# Patient Record
Sex: FEMALE | URBAN - METROPOLITAN AREA
[De-identification: names, ages, dates, MRNs, and addresses within clinical notes are randomized per-mention and may not be internally consistent; named-entity substitution may affect disease eponyms.]

---

## 2022-11-23 ENCOUNTER — HOME HEALTH ADMISSION (OUTPATIENT)
Dept: HOME HEALTH SERVICES | Facility: HOME HEALTH | Age: 87
End: 2022-11-23
Payer: MEDICARE

## 2022-11-25 ENCOUNTER — HOME CARE VISIT (OUTPATIENT)
Dept: SCHEDULING | Facility: HOME HEALTH | Age: 87
End: 2022-11-25

## 2022-11-25 VITALS
HEART RATE: 64 BPM | RESPIRATION RATE: 18 BRPM | OXYGEN SATURATION: 98 % | SYSTOLIC BLOOD PRESSURE: 122 MMHG | DIASTOLIC BLOOD PRESSURE: 74 MMHG | TEMPERATURE: 97.4 F

## 2022-11-25 PROCEDURE — 0221000100 HH NO PAY CLAIM PROCEDURE

## 2022-11-25 PROCEDURE — G0299 HHS/HOSPICE OF RN EA 15 MIN: HCPCS

## 2022-11-25 ASSESSMENT — ENCOUNTER SYMPTOMS
BOWEL INCONTINENCE: 1
DYSPNEA ACTIVITY LEVEL: AFTER AMBULATING MORE THAN 20 FT
PAIN LOCATION - PAIN QUALITY: ACHES

## 2022-11-25 NOTE — HOME HEALTH
Problem: Recent GLF, PNA, Hopsitalization/Rehab return to longterm with weakness, unsteady gait, functional decline, deconditioning. Open wound to LUE 4x2.5x0.1, pink, small SS drainage. PMH: TIA, HLD, [place], Dementia, Turner Palsy, Encephalopathy, HTN, CAD, Diverticulitis, Spinal Stenosis, Rhabdomyolosis, Falls. Intervention: [facility] SN SOC: Patient resides in [facility] as noted, above, alert and oriented x 2 person place, foregtful, non-verbal, [place]/deaf, uses communication board, limited cognition R/T dementia. Weakness, unsteady gait, fall risk, deconditioning since returning to longterm, requires moderate/Max assist for transfer/ADLS, will have PT evaluation and treatment. Recent PNA, lung diminished, VS/SATS WNL, +1 BLLE edema, encouraged cough/deep breathing, instructed on PNA S/S, infection prevention, needs reinforcement. Chronic pain OA/spinal stenosis, 0-4/10 with activity, treated with medication/rest. Open wound as noted above, wound care per POC, tolerated well, instructed on wound care diet to promote healing, infection prevention, needs reinforcement. Medications reconciled, managed by longterm, reviewed, instructed on high risk medication Plavix/risk for bleeding, needs reinforcement. Instructed on [facility] SOC when to call/911, safety/fall/pressure injury/bleeding precautions, PNA, TIA, pain, wound care, medication/disease management, and plan of care, needs reinforcement. Goal: Patient will be safe at home free from falls/injury/infection, wound healed, free from complications of bleeding, PNA resolved, rehabilitate to optimal level of function.

## 2022-11-26 ENCOUNTER — HOME CARE VISIT (OUTPATIENT)
Dept: HOME HEALTH SERVICES | Facility: HOME HEALTH | Age: 87
End: 2022-11-26

## 2022-11-26 PROCEDURE — G0151 HHCP-SERV OF PT,EA 15 MIN: HCPCS

## 2022-11-29 ENCOUNTER — HOME CARE VISIT (OUTPATIENT)
Dept: HOME HEALTH SERVICES | Facility: HOME HEALTH | Age: 87
End: 2022-11-29

## 2022-11-29 PROCEDURE — G0151 HHCP-SERV OF PT,EA 15 MIN: HCPCS

## 2022-11-30 ENCOUNTER — HOME CARE VISIT (OUTPATIENT)
Dept: SCHEDULING | Facility: HOME HEALTH | Age: 87
End: 2022-11-30

## 2022-11-30 ENCOUNTER — HOME CARE VISIT (OUTPATIENT)
Dept: HOME HEALTH SERVICES | Facility: HOME HEALTH | Age: 87
End: 2022-11-30
Payer: MEDICARE

## 2022-11-30 VITALS
SYSTOLIC BLOOD PRESSURE: 141 MMHG | DIASTOLIC BLOOD PRESSURE: 66 MMHG | TEMPERATURE: 97.6 F | OXYGEN SATURATION: 95 % | RESPIRATION RATE: 16 BRPM | HEART RATE: 74 BPM

## 2022-11-30 PROCEDURE — G0300 HHS/HOSPICE OF LPN EA 15 MIN: HCPCS

## 2022-11-30 ASSESSMENT — ENCOUNTER SYMPTOMS
HEMOPTYSIS: 0
BOWEL INCONTINENCE: 1

## 2022-12-02 ENCOUNTER — HOME CARE VISIT (OUTPATIENT)
Dept: HOME HEALTH SERVICES | Facility: HOME HEALTH | Age: 87
End: 2022-12-02

## 2022-12-02 ENCOUNTER — HOME CARE VISIT (OUTPATIENT)
Dept: SCHEDULING | Facility: HOME HEALTH | Age: 87
End: 2022-12-02

## 2022-12-02 VITALS
DIASTOLIC BLOOD PRESSURE: 72 MMHG | TEMPERATURE: 97.9 F | SYSTOLIC BLOOD PRESSURE: 138 MMHG | OXYGEN SATURATION: 96 % | RESPIRATION RATE: 16 BRPM | HEART RATE: 68 BPM

## 2022-12-02 PROCEDURE — G0151 HHCP-SERV OF PT,EA 15 MIN: HCPCS

## 2022-12-02 PROCEDURE — G0300 HHS/HOSPICE OF LPN EA 15 MIN: HCPCS

## 2022-12-02 ASSESSMENT — ENCOUNTER SYMPTOMS
BOWEL INCONTINENCE: 1
HEMOPTYSIS: 0

## 2022-12-02 NOTE — HOME HEALTH
Patient with wound care, mobilty and cognitive impairment   Wound Care to upper extremity Provided per care plan. Pt tolerated well. Caregiver involvement: [facility] staff   Patient education provided this visit: SN educated patient and patient's caregiver on pain management. Patient and patient caregiver verbalizes understanding via the teach back method.    Progress toward goals: progressing well client alert and oriented, vital signs stable, client deines pain at this time, wound care was provided per md order, no drainage ntoed patient tolerated wound care well, no edema noted lung sounds were clear to ausucltaion, denies cough, fall precautions were maintained, supplies to be ordered today  Plan for next visit: wound care   discharge planning was discussed with the patient/ patient's caregiver: DC when goals met

## 2022-12-06 ENCOUNTER — HOME CARE VISIT (OUTPATIENT)
Dept: HOME HEALTH SERVICES | Facility: HOME HEALTH | Age: 87
End: 2022-12-06

## 2022-12-07 ENCOUNTER — HOME CARE VISIT (OUTPATIENT)
Dept: SCHEDULING | Facility: HOME HEALTH | Age: 87
End: 2022-12-07

## 2022-12-07 VITALS
HEART RATE: 70 BPM | SYSTOLIC BLOOD PRESSURE: 132 MMHG | DIASTOLIC BLOOD PRESSURE: 64 MMHG | OXYGEN SATURATION: 99 % | TEMPERATURE: 97.7 F | RESPIRATION RATE: 16 BRPM

## 2022-12-07 PROCEDURE — G0299 HHS/HOSPICE OF RN EA 15 MIN: HCPCS

## 2022-12-07 ASSESSMENT — ENCOUNTER SYMPTOMS
BOWEL INCONTINENCE: 1
DYSPNEA ACTIVITY LEVEL: AFTER AMBULATING MORE THAN 20 FT

## 2022-12-07 NOTE — HOME HEALTH
Problem: LUE wound with routine healing, pink, small SS drianage, 3x0.2x0.1. Intervention: [facility]HC SN for routine wound care, tolerated well, per POC, instructed on wound care/diet/infection prevention, needs reinforcement. VS/SATS WNL, patient tired today. Instructed on wound care, medications/disease management, safety/fall/pressure injury/bleeding precautions and plan of care, needs reinforcement. Goal: Patient will be safe at home free from falls/injury/infection, wound healed free from complications of bleeding.

## 2022-12-08 ENCOUNTER — HOME CARE VISIT (OUTPATIENT)
Dept: HOME HEALTH SERVICES | Facility: HOME HEALTH | Age: 87
End: 2022-12-08

## 2022-12-08 PROCEDURE — G0151 HHCP-SERV OF PT,EA 15 MIN: HCPCS

## 2022-12-09 ENCOUNTER — HOME CARE VISIT (OUTPATIENT)
Dept: SCHEDULING | Facility: HOME HEALTH | Age: 87
End: 2022-12-09

## 2022-12-09 VITALS
DIASTOLIC BLOOD PRESSURE: 60 MMHG | OXYGEN SATURATION: 95 % | SYSTOLIC BLOOD PRESSURE: 118 MMHG | TEMPERATURE: 98 F | RESPIRATION RATE: 16 BRPM | HEART RATE: 64 BPM

## 2022-12-09 PROCEDURE — G0300 HHS/HOSPICE OF LPN EA 15 MIN: HCPCS

## 2022-12-09 ASSESSMENT — ENCOUNTER SYMPTOMS
HEMOPTYSIS: 0
BOWEL INCONTINENCE: 1

## 2022-12-09 NOTE — HOME HEALTH
Patient with wounds, cognitive and mobility impairmnet   wound care provided per md order   Caregiver involvement: resides in [facility]   Patient education provided this visit: SN educated patient and patient's caregiver on fall precautions and injury prevention. Patient and patient caregiver verbalizes understanding via the teach back method.    Progress toward goals: progressing well client alert and oriented, vital signs were within normal limits, client denies pain at this time, wound care was provided per md order, patient tolerated well, no drianage noted at this time, no signs or symptoms of infection noted, no edema was noted, lung sounds were clear to auscultation, denies cough or shortness of breath, fall preacautions maintained   Plan for next visit: wound care   discharge planning was discussed with the patient/ patient's caregiver: DC when goals met

## 2022-12-14 ENCOUNTER — HOME CARE VISIT (OUTPATIENT)
Dept: SCHEDULING | Facility: HOME HEALTH | Age: 87
End: 2022-12-14
Payer: MEDICARE

## 2022-12-14 VITALS
RESPIRATION RATE: 16 BRPM | OXYGEN SATURATION: 98 % | SYSTOLIC BLOOD PRESSURE: 116 MMHG | TEMPERATURE: 98.1 F | DIASTOLIC BLOOD PRESSURE: 64 MMHG | HEART RATE: 59 BPM

## 2022-12-14 PROCEDURE — G0299 HHS/HOSPICE OF RN EA 15 MIN: HCPCS

## 2022-12-20 ENCOUNTER — HOME CARE VISIT (OUTPATIENT)
Dept: SCHEDULING | Facility: HOME HEALTH | Age: 87
End: 2022-12-20
Payer: MEDICARE

## 2022-12-20 VITALS
HEART RATE: 76 BPM | DIASTOLIC BLOOD PRESSURE: 80 MMHG | RESPIRATION RATE: 16 BRPM | TEMPERATURE: 97.6 F | SYSTOLIC BLOOD PRESSURE: 138 MMHG | OXYGEN SATURATION: 97 %

## 2022-12-20 PROCEDURE — G0299 HHS/HOSPICE OF RN EA 15 MIN: HCPCS

## 2022-12-20 ASSESSMENT — ENCOUNTER SYMPTOMS
DYSPNEA ACTIVITY LEVEL: AFTER AMBULATING MORE THAN 20 FT
HEMOPTYSIS: 0

## 2022-12-20 NOTE — HOME HEALTH
Patient today for traumatic skin injury left lower thight. Pt is alert and oreinted to person and place. VSS, lungs CTA, active bowel sounds, 0 edema BLE. Wound care provided to left lower thight 1.5x.3x.1cm- cleaned with normal saline, skin prep to sammy wound area, applied xeroform and covered with foam dressing. Scant amt of bloody drainage. Wound Care Provided per care plan. Pt tolerated well. Caregiver involvement:  [facility] staff  Medications reconciled and all medications are available in   Home health supplies by type and quantity ordered/delivered this visit include: ordered  Patient education provided this visit: SN educated patient and patient's caregiver on s/s of infection and fall prevention.  Patient and patient caregiver verbalizes understanding via the teach back method.    Progress toward goals: progressing well  Plan for next visit: wound care  The following discharge planning was discussed with the patient/ patient's caregiver: DC when goals met

## 2022-12-22 ENCOUNTER — HOME CARE VISIT (OUTPATIENT)
Dept: SCHEDULING | Facility: HOME HEALTH | Age: 87
End: 2022-12-22
Payer: MEDICARE

## 2022-12-22 VITALS
HEART RATE: 58 BPM | SYSTOLIC BLOOD PRESSURE: 127 MMHG | RESPIRATION RATE: 16 BRPM | DIASTOLIC BLOOD PRESSURE: 64 MMHG | OXYGEN SATURATION: 96 % | TEMPERATURE: 97.9 F

## 2022-12-22 PROCEDURE — G0300 HHS/HOSPICE OF LPN EA 15 MIN: HCPCS

## 2022-12-22 ASSESSMENT — ENCOUNTER SYMPTOMS
BOWEL INCONTINENCE: 1
HEMOPTYSIS: 0

## 2022-12-22 NOTE — HOME HEALTH
Patient with HTN, wound, mobility and cognitive impairment   wound care provided per md order, patient toelrated well   Caregiver involvement: resides in [facility]   Patient education provided this visit: SN educated patient and patient's caregiver on HTN management and care. Patient and patient caregiver verbalizes understanding via the teach back method.    Progress toward goals: progressing well client alert and oriented time 2, vital signs within normal limits, client denies pain, wound care provided to left thigh, scant bloody drainage noted, patient tolerated well, no signs or sympotms of infection noted, no edema noted, lung sounds were clear to auscultation, no cough or shortness of breath was noted, fall precautions were maintained   Plan for next visit: wound care   discharge planning was discussed with the patient/ patient's caregiver: DC when goals met

## 2022-12-27 ENCOUNTER — HOME CARE VISIT (OUTPATIENT)
Dept: SCHEDULING | Facility: HOME HEALTH | Age: 87
End: 2022-12-27
Payer: MEDICARE

## 2022-12-27 VITALS
RESPIRATION RATE: 16 BRPM | OXYGEN SATURATION: 96 % | HEART RATE: 74 BPM | TEMPERATURE: 98 F | DIASTOLIC BLOOD PRESSURE: 70 MMHG | SYSTOLIC BLOOD PRESSURE: 100 MMHG

## 2022-12-27 PROCEDURE — G0300 HHS/HOSPICE OF LPN EA 15 MIN: HCPCS

## 2022-12-27 ASSESSMENT — ENCOUNTER SYMPTOMS
BOWEL INCONTINENCE: 1
HEMOPTYSIS: 0

## 2022-12-27 NOTE — HOME HEALTH
Patient with wound, mobility and cognitive impairment   wound care provided per md order, patient toelrated well   Caregiver involvement: resides in [facility]   Patient education provided this visit: SN educated patient and patient's caregiver on pressure reliveing techniques and disease process. Patient and patient caregiver verbalizes understanding via the teach back method.    Progress toward goals: progressing well client alert and oriented time 2, vital signs within normal limits, client denies pain, wound care was provided per md order to left thigh, no drainage noted, measurements were obtained, no signs or symptoms of infection noted, no edema noted, lung sounds were clear to auscultation, no cough or shortness of breath was noted, fall precautions were maintained   Plan for next visit: wound care  discharge planning was discussed with the patient/ patient's caregiver: DC when goals met

## 2022-12-29 ENCOUNTER — HOME CARE VISIT (OUTPATIENT)
Dept: HOME HEALTH SERVICES | Facility: HOME HEALTH | Age: 87
End: 2022-12-29
Payer: MEDICARE

## 2022-12-29 VITALS
OXYGEN SATURATION: 96 % | RESPIRATION RATE: 16 BRPM | SYSTOLIC BLOOD PRESSURE: 104 MMHG | DIASTOLIC BLOOD PRESSURE: 68 MMHG | TEMPERATURE: 97.4 F | HEART RATE: 70 BPM

## 2022-12-29 PROCEDURE — G0299 HHS/HOSPICE OF RN EA 15 MIN: HCPCS

## 2022-12-29 ASSESSMENT — ENCOUNTER SYMPTOMS
HEMOPTYSIS: 0
DYSPNEA ACTIVITY LEVEL: AFTER AMBULATING MORE THAN 20 FT

## 2022-12-29 NOTE — HOME HEALTH
Patient today for traumatic skin injury left lower thight. Pt is alert and oreinted to person and place. VSS, lungs CTA, active bowel sounds, 0 edema BLE. Wound care provided to left lower thight-cleaned with normal saline, skin prep to sammy wound area, applied xeroform and covered with foam dressing. 0 drainage. Wound Care Provided per care plan. Pt tolerated well. Caregiver involvement: [facility] staff Medications reconciled and all medications are available in Home health supplies by type and quantity ordered/delivered this visit include: ordered Patient education provided this visit: SN educated patient and patient's caregiver on s/s of infection, home safety and fall prevention. Patient and patient caregiver verbalizes understanding via the teach back method.  Progress toward goals: progressing well Plan for next visit: wound care The following discharge planning was discussed with the patient/ patient's caregiver: DC when goals met

## 2022-12-30 ASSESSMENT — ENCOUNTER SYMPTOMS: DYSPNEA ACTIVITY LEVEL: AFTER AMBULATING MORE THAN 20 FT

## 2023-01-02 ENCOUNTER — HOME CARE VISIT (OUTPATIENT)
Dept: SCHEDULING | Facility: HOME HEALTH | Age: 88
End: 2023-01-02
Payer: MEDICARE

## 2023-01-02 VITALS
OXYGEN SATURATION: 99 % | TEMPERATURE: 97.7 F | SYSTOLIC BLOOD PRESSURE: 118 MMHG | DIASTOLIC BLOOD PRESSURE: 70 MMHG | RESPIRATION RATE: 16 BRPM | HEART RATE: 55 BPM

## 2023-01-02 PROCEDURE — G0300 HHS/HOSPICE OF LPN EA 15 MIN: HCPCS

## 2023-01-02 ASSESSMENT — ENCOUNTER SYMPTOMS
BOWEL INCONTINENCE: 1
HEMOPTYSIS: 0

## 2023-01-02 NOTE — HOME HEALTH
Patient with DM, wound, mobility and cognitive impairment   wound care provided per md order, patient toelrated well   Caregiver involvement: resides in [facility]   Patient education provided this visit: SN educated patient and patient's caregiver on fall prevention techniques and signs and symptoms of infection. Patient and patient caregiver verbalizes understanding via the teach back method.    Progress toward goals: progressing well client alert and oriented time 3, vital signs within normal limits, client denies pain, wound care was provided to left thigh, no drainage noted, no signs or symptoms of infection,1.3x0.2x0, patient tolerated well, no edema noted, lung sounds were clear to auscultation, no cough or shortness of breath was noted, fall precautions were maintained   Plan for next visit: wound care   discharge planning was discussed with the patient/ patient's caregiver: DC when goals met

## 2023-01-05 ENCOUNTER — HOME CARE VISIT (OUTPATIENT)
Dept: SCHEDULING | Facility: HOME HEALTH | Age: 88
End: 2023-01-05
Payer: MEDICARE

## 2023-01-05 VITALS
HEART RATE: 63 BPM | OXYGEN SATURATION: 92 % | RESPIRATION RATE: 16 BRPM | TEMPERATURE: 97.9 F | DIASTOLIC BLOOD PRESSURE: 70 MMHG | SYSTOLIC BLOOD PRESSURE: 120 MMHG

## 2023-01-05 PROCEDURE — G0300 HHS/HOSPICE OF LPN EA 15 MIN: HCPCS

## 2023-01-05 ASSESSMENT — ENCOUNTER SYMPTOMS
HEMOPTYSIS: 0
BOWEL INCONTINENCE: 1

## 2023-01-05 NOTE — HOME HEALTH
Patient with  wound, mobility and cognitive impairment   wound care provided per md order, patient toelrated well   Caregiver involvement: resides in [facility]   Patient education provided this visit: SN educated patient and patient's caregiver on fall prevention techniques. Patient and patient caregiver verbalizes understanding via the teach back method.    Progress toward goals: progressing well client alert, vital signs within normal limits, client denies pain, wound care provided per md order, no drainage noted, no signs or symptoms of infection, two new reddened none draining areas noted to left lower extremity will continue to watch both measuring 0.3x0.3x0, no edema noted, lung sounds were clear to auscultation, no cough or shortness of breath was noted, fall precautions were maintained   Plan for next visit: wound care    discharge planning was discussed with the patient/ patient's caregiver: DC when goals met

## 2023-01-11 ENCOUNTER — HOME CARE VISIT (OUTPATIENT)
Dept: HOME HEALTH SERVICES | Facility: HOME HEALTH | Age: 88
End: 2023-01-11
Payer: MEDICARE

## 2023-01-16 ENCOUNTER — HOME CARE VISIT (OUTPATIENT)
Dept: SCHEDULING | Facility: HOME HEALTH | Age: 88
End: 2023-01-16
Payer: MEDICARE

## 2023-01-16 PROCEDURE — G0299 HHS/HOSPICE OF RN EA 15 MIN: HCPCS

## 2023-01-17 VITALS
DIASTOLIC BLOOD PRESSURE: 68 MMHG | SYSTOLIC BLOOD PRESSURE: 120 MMHG | OXYGEN SATURATION: 97 % | RESPIRATION RATE: 16 BRPM | HEART RATE: 67 BPM | TEMPERATURE: 98 F

## 2023-01-17 ASSESSMENT — ENCOUNTER SYMPTOMS: BOWEL INCONTINENCE: 1

## 2023-01-17 NOTE — HOME HEALTH
Patient A&O x 2 name and place. Pt. cooperative and is noted to be [place]/deaf. Used white board to communicate POC for patient's left tigh wound dressing. Removed old foam dressing, using saline to help remove. No drainage noted on old dressing. Wound cleansed with normal saline and re-dressed with new 3x3 foam dressing. Pt.tolerated well and denied any pain. Increased water intake encouraged. Spoke with [facility] caregiver to ascertain that patient eats all three meals in the community dining room. TY aid assisted to help patient to stand to check coccyx and buttocks. No wounds noted.

## 2023-01-19 ENCOUNTER — HOME CARE VISIT (OUTPATIENT)
Dept: HOME HEALTH SERVICES | Facility: HOME HEALTH | Age: 88
End: 2023-01-19

## 2023-01-19 PROCEDURE — G0299 HHS/HOSPICE OF RN EA 15 MIN: HCPCS

## 2023-01-21 ENCOUNTER — HOME CARE VISIT (OUTPATIENT)
Dept: HOME HEALTH SERVICES | Facility: HOME HEALTH | Age: 88
End: 2023-01-21
Payer: MEDICARE

## 2023-01-21 VITALS
OXYGEN SATURATION: 99 % | RESPIRATION RATE: 20 BRPM | WEIGHT: 120 LBS | SYSTOLIC BLOOD PRESSURE: 128 MMHG | TEMPERATURE: 97.8 F | DIASTOLIC BLOOD PRESSURE: 66 MMHG | HEART RATE: 67 BPM

## 2023-01-22 ASSESSMENT — ENCOUNTER SYMPTOMS
DIARRHEA: 1
STOOL DESCRIPTION: DIARRHEA

## 2023-01-22 NOTE — HOME HEALTH
Patient with left thigh trauma wound 0.1cm x 0.1 cm x 0 cm, dry and intact with very fragile surrounding skin. Wound Care: remove old dressing using normal saline if necessary, cleanse wound with normal saline, skin prep to sammy-wound, apply new 3x3 foam dressing with adhesive border 1 x weekly until fully healed. Wound will be measured and recorded 1 x weekly as provided per care plan. Pt tolerated well most recent dressing on 1/19/23 well. Caregiver involvement: [facility] staff for assistance with all ADLs  Medications reconciled and all medications are available in patient's medication cart in [facility]  Home health supplies by type and quantity ordered/delivered this visit include: Normal saline solution, 4 x 4 gauze, skin prep, 3x3 foam gauze with adhesive border, clean gloves  Patient education provided this visit: SN educated patient and patient's caregiver on increased water and protein intake to promote wound healing via white board and verbal instruction. Patient demonstrates understanding via nod of head to acknowledge education; needs reinforced daily. Progress toward goals: progressing well  Home exercise program: Passive ROM and assistance for all transfers and ambulation with RW/manual w/c   Plan for next visit: Left thigh wound care per Rupal ROSAS  The following discharge planning was discussed with the patient/ patient's caregiver: Patient was re-cert for ongoing Rupal ROSAS on 1/19/23 and will DC when goals met.

## 2023-01-24 ENCOUNTER — HOME CARE VISIT (OUTPATIENT)
Dept: SCHEDULING | Facility: HOME HEALTH | Age: 88
End: 2023-01-24

## 2023-01-24 PROCEDURE — G0299 HHS/HOSPICE OF RN EA 15 MIN: HCPCS

## 2023-01-28 VITALS
DIASTOLIC BLOOD PRESSURE: 60 MMHG | SYSTOLIC BLOOD PRESSURE: 120 MMHG | HEART RATE: 60 BPM | RESPIRATION RATE: 18 BRPM | OXYGEN SATURATION: 98 %

## 2023-01-28 NOTE — HOME HEALTH
Greeted pt. in her [facility] room sitting up in her chair and resting her eyes. Communicted with pt. through writing on her white dry erase board to assess pain and any problems or concerns,  and to inform patient of planned dressing change to let thigh. Pt. nods and pulls up her left pant leg to access current dressing. Wound Care to left thigh performed as follows: removed old dressing, wound measured 0.2 cm x 0.5 cm x 0 cm and cleansed with normal saline, skin prep to sammy-wound, applied new 3x3 foam dressing with adhesive border. Pt. tolerated well.

## 2023-01-31 ENCOUNTER — HOME CARE VISIT (OUTPATIENT)
Dept: SCHEDULING | Facility: HOME HEALTH | Age: 88
End: 2023-01-31

## 2023-01-31 VITALS
RESPIRATION RATE: 20 BRPM | TEMPERATURE: 98 F | HEART RATE: 62 BPM | OXYGEN SATURATION: 96 % | SYSTOLIC BLOOD PRESSURE: 130 MMHG | DIASTOLIC BLOOD PRESSURE: 70 MMHG

## 2023-01-31 PROCEDURE — G0299 HHS/HOSPICE OF RN EA 15 MIN: HCPCS

## 2023-01-31 ASSESSMENT — ENCOUNTER SYMPTOMS
BOWEL INCONTINENCE: 1
DIARRHEA: 1
STOOL DESCRIPTION: DIARRHEA

## 2023-01-31 NOTE — HOME HEALTH
Greeted pt. in her TY room sitting up in her chair. Communicted with pt. through writing on her white dry erase board to assess pain and any problems or concerns, and to inform patient of planned dressing change to let thigh. Pt. nods and pulls up her left pant leg to access current dressing. Wound Care to left thigh performed as follows: removed old dressing, wound measured 1 cm x 0.5 cm x 0 cm and cleansed with normal saline, skin prep to sammy-wound, applied new 3x3 foam dressing with adhesive border. Pt. tolerated well. Pt asks for assistance to bathroom. Writer assists patient with transfer with rolling walker and ambulation to bathroom. Pt. cleaned up of small soft, border-line diarrhea incontinent stool in disposable adult underwear. Pt. noted to have small area of erythema to coccyx and inner left buttocks. After cleaning pt., ordered Zinc paste applied to intact blanchable erythemic area. skilled nursing staff informed of erythema and asked to keep buttocks, coccyx clean and dry and to apply pt's ordered Zinc paste applied to coccyx, left buttocks after each stool. Staff PCT repeats back understanding.

## 2023-02-07 ENCOUNTER — HOME CARE VISIT (OUTPATIENT)
Dept: SCHEDULING | Facility: HOME HEALTH | Age: 88
End: 2023-02-07
Payer: MEDICARE

## 2023-02-07 PROCEDURE — G0299 HHS/HOSPICE OF RN EA 15 MIN: HCPCS

## 2023-02-09 VITALS
DIASTOLIC BLOOD PRESSURE: 64 MMHG | HEART RATE: 80 BPM | RESPIRATION RATE: 20 BRPM | TEMPERATURE: 96 F | SYSTOLIC BLOOD PRESSURE: 120 MMHG | OXYGEN SATURATION: 96 %

## 2023-02-13 ENCOUNTER — HOME CARE VISIT (OUTPATIENT)
Dept: SCHEDULING | Facility: HOME HEALTH | Age: 88
End: 2023-02-13
Payer: MEDICARE

## 2023-02-13 PROCEDURE — G0299 HHS/HOSPICE OF RN EA 15 MIN: HCPCS

## 2023-02-16 VITALS
TEMPERATURE: 97.1 F | RESPIRATION RATE: 18 BRPM | SYSTOLIC BLOOD PRESSURE: 132 MMHG | OXYGEN SATURATION: 100 % | HEART RATE: 70 BPM | DIASTOLIC BLOOD PRESSURE: 70 MMHG

## 2023-02-16 ASSESSMENT — ENCOUNTER SYMPTOMS: BOWEL INCONTINENCE: 1

## 2023-02-17 NOTE — HOME HEALTH
Greeted pt. who is sitting in her chair smiling at writer when entering her T[facility] room. Communicated with pt. through writing on her white dry erase board to assess pain and any problems or concerns, and to inform patient of planned dressing change to let thigh. Pt. nods and pulls up her left pant leg to access current dressing. Wound Care to left thigh performed as follows: removed old dressing, wound cleansed with normal saline and measured 0.2 cm x 0.1 cm x 0 cm. Skin prep applied to sammy-wound, new 3x3 foam dressing with adhesive border. Pt. tolerated well. Pt's skin integrity is generally dry but intact other than the left thigh wound. Pt. has no water at her bedside, but is drinking a Boost. Will ask retirement staff to keep water at patient's bedside AAT.

## 2023-02-21 ENCOUNTER — HOME CARE VISIT (OUTPATIENT)
Dept: SCHEDULING | Facility: HOME HEALTH | Age: 88
End: 2023-02-21
Payer: MEDICARE

## 2023-02-21 PROCEDURE — G0299 HHS/HOSPICE OF RN EA 15 MIN: HCPCS

## 2023-02-22 VITALS
RESPIRATION RATE: 16 BRPM | DIASTOLIC BLOOD PRESSURE: 60 MMHG | HEART RATE: 64 BPM | TEMPERATURE: 97.2 F | SYSTOLIC BLOOD PRESSURE: 130 MMHG | OXYGEN SATURATION: 96 %

## 2023-02-22 ASSESSMENT — ENCOUNTER SYMPTOMS: BOWEL INCONTINENCE: 1

## 2023-02-22 NOTE — HOME HEALTH
Greeted pt. who is sitting in her chair smiling at writer when entering her [facility] room. Communicated with pt. through writing on her white dry erase board to assess pain and any problems or concerns, and to inform patient of planned dressing change to left thigh. VSS, physical assessment benign. Wound Care to left thigh performed as follows: removed old dressing, wound cleansed with normal saline and measured 0.1 cm x 0.1 cm x 0 cm. Skin prep applied to sammy-wound, new 3x3 foam dressing with adhesive border. Pt. tolerated well. Pt's skin integrity is generally dry but intact other than the left thigh wound. Pt. has no water or Boost at chairside. Provided pt. with a cold Boost from her refrigerator. Will ask [facility] staff to keep water/Boost at patient's bedside AAT.

## 2023-02-28 ENCOUNTER — HOME CARE VISIT (OUTPATIENT)
Dept: HOME HEALTH SERVICES | Facility: HOME HEALTH | Age: 88
End: 2023-02-28
Payer: MEDICARE

## 2023-02-28 PROCEDURE — G0299 HHS/HOSPICE OF RN EA 15 MIN: HCPCS

## 2023-03-01 VITALS
DIASTOLIC BLOOD PRESSURE: 80 MMHG | RESPIRATION RATE: 18 BRPM | OXYGEN SATURATION: 99 % | HEART RATE: 72 BPM | SYSTOLIC BLOOD PRESSURE: 160 MMHG | TEMPERATURE: 97.7 F

## 2023-03-01 ASSESSMENT — ENCOUNTER SYMPTOMS: BOWEL INCONTINENCE: 1

## 2023-03-07 ENCOUNTER — HOME CARE VISIT (OUTPATIENT)
Dept: HOME HEALTH SERVICES | Facility: HOME HEALTH | Age: 88
End: 2023-03-07
Payer: MEDICARE

## 2023-03-07 ENCOUNTER — HOME CARE VISIT (OUTPATIENT)
Dept: SCHEDULING | Facility: HOME HEALTH | Age: 88
End: 2023-03-07
Payer: MEDICARE

## 2023-03-07 PROCEDURE — G0299 HHS/HOSPICE OF RN EA 15 MIN: HCPCS

## 2023-03-08 ENCOUNTER — HOME CARE VISIT (OUTPATIENT)
Dept: HOME HEALTH SERVICES | Facility: HOME HEALTH | Age: 88
End: 2023-03-08
Payer: MEDICARE

## 2023-03-08 PROCEDURE — G0151 HHCP-SERV OF PT,EA 15 MIN: HCPCS

## 2023-03-09 VITALS
TEMPERATURE: 97.4 F | DIASTOLIC BLOOD PRESSURE: 56 MMHG | SYSTOLIC BLOOD PRESSURE: 120 MMHG | RESPIRATION RATE: 16 BRPM | OXYGEN SATURATION: 95 % | HEART RATE: 60 BPM

## 2023-03-09 VITALS
HEART RATE: 78 BPM | DIASTOLIC BLOOD PRESSURE: 70 MMHG | SYSTOLIC BLOOD PRESSURE: 128 MMHG | TEMPERATURE: 96.9 F | RESPIRATION RATE: 14 BRPM

## 2023-03-09 ASSESSMENT — ENCOUNTER SYMPTOMS
CONSTIPATION: 1
COUGH CHARACTERISTICS: DRY
COUGH: 1

## 2023-03-09 NOTE — HOME HEALTH
Problems: Slow-healing left thigh trauma wound and patient with acute fatigue, constipation and nasal drainage    Interventions: Left thigh trauma wound is closed without s/s of infection. Healed wound cleansed with normal saline and skin prep applied to healed wound and covered with 3x3 foam dressing with adhesive border to protect new wound closure. Informed TY nurse, Carolin, about pt's c/o no BM for at least two days, acute fatigue, and acute nasal drainage. Encouraged pt. to continue to drink the cold cup of water at her chairside. Goals: If wound remains closed and pt. has no other skillable SN HHC needs will discharge pt. at next visit.

## 2023-03-11 ENCOUNTER — HOME CARE VISIT (OUTPATIENT)
Dept: HOME HEALTH SERVICES | Facility: HOME HEALTH | Age: 88
End: 2023-03-11
Payer: MEDICARE

## 2023-03-11 PROCEDURE — G0157 HHC PT ASSISTANT EA 15: HCPCS

## 2023-03-13 ENCOUNTER — HOME CARE VISIT (OUTPATIENT)
Dept: HOME HEALTH SERVICES | Facility: HOME HEALTH | Age: 88
End: 2023-03-13
Payer: MEDICARE

## 2023-03-13 PROCEDURE — G0299 HHS/HOSPICE OF RN EA 15 MIN: HCPCS

## 2023-03-14 VITALS
DIASTOLIC BLOOD PRESSURE: 64 MMHG | HEART RATE: 64 BPM | RESPIRATION RATE: 16 BRPM | SYSTOLIC BLOOD PRESSURE: 128 MMHG | TEMPERATURE: 98.4 F | OXYGEN SATURATION: 98 %

## 2023-03-14 NOTE — HOME HEALTH
Problem: Slow healing left thigh trauma wound    Interventions: Wound care to left medial-lateral thigh traumic wound  was performed as follows: Removed pt's current 4x4 adhesive foam dressing. Wound is noted to remain fully healed for one week without s/s of local or stemib infection. Cleansed heled wound with normal saline and left this wound APOLINAR. Pt was both verabally educated and with use of white board to continue to stay well-hydrated with water and to eat protein-enriched food. Pt. nods understanding. Pt. was informed that her left thigh wound is fully healed. Goals: Pt will not have any new wounds or other alterations in skin integrity or s/s of infection throughout the [place] [id number] esisode.

## 2023-03-15 ENCOUNTER — HOME CARE VISIT (OUTPATIENT)
Dept: HOME HEALTH SERVICES | Facility: HOME HEALTH | Age: 88
End: 2023-03-15
Payer: MEDICARE

## 2023-03-15 PROCEDURE — G0151 HHCP-SERV OF PT,EA 15 MIN: HCPCS

## 2023-03-15 ASSESSMENT — ENCOUNTER SYMPTOMS: BOWEL INCONTINENCE: 1

## 2023-03-16 VITALS
SYSTOLIC BLOOD PRESSURE: 116 MMHG | DIASTOLIC BLOOD PRESSURE: 68 MMHG | HEART RATE: 67 BPM | RESPIRATION RATE: 15 BRPM | TEMPERATURE: 97.7 F

## 2023-03-17 ENCOUNTER — HOME CARE VISIT (OUTPATIENT)
Dept: SCHEDULING | Facility: HOME HEALTH | Age: 88
End: 2023-03-17
Payer: MEDICARE

## 2023-03-17 PROCEDURE — G0151 HHCP-SERV OF PT,EA 15 MIN: HCPCS

## 2023-03-20 ENCOUNTER — HOME CARE VISIT (OUTPATIENT)
Dept: HOME HEALTH SERVICES | Facility: HOME HEALTH | Age: 88
End: 2023-03-20
Payer: MEDICARE

## 2023-03-21 VITALS
TEMPERATURE: 97.6 F | SYSTOLIC BLOOD PRESSURE: 122 MMHG | DIASTOLIC BLOOD PRESSURE: 68 MMHG | RESPIRATION RATE: 14 BRPM | HEART RATE: 66 BPM

## 2023-03-24 ENCOUNTER — HOME CARE VISIT (OUTPATIENT)
Dept: SCHEDULING | Facility: HOME HEALTH | Age: 88
End: 2023-03-24
Payer: MEDICARE

## 2023-03-24 PROCEDURE — G0151 HHCP-SERV OF PT,EA 15 MIN: HCPCS

## 2023-03-26 VITALS
TEMPERATURE: 97.1 F | OXYGEN SATURATION: 98 % | DIASTOLIC BLOOD PRESSURE: 60 MMHG | HEART RATE: 64 BPM | RESPIRATION RATE: 16 BRPM | SYSTOLIC BLOOD PRESSURE: 100 MMHG

## 2023-03-26 NOTE — HOME HEALTH
Pt was sleeping in her recliner upon PT arrival. PT needed to write questions on whiteboard for pt to answer. Patient instructed to continue HEP at least 2 times daily for continued progression and benefits. Patient instructed to call 911 in case of medical emergency and to call MD for any changes to medical status. Therapist instructed patient to continue taking medications as prescribed by MD and obtain new prescription/refills from pharmacy as appropriate. Patient educated on importance of keeping all MD/lab/therapy appointments. Patient instructed to keep list of emergency phone numbers by the phone or in their mobile phone and to wash hands frequently to prevent spread of infection. Patient verbalizes understanding. Patient DC from [place] [id number] services with all goals met. Patient participated in therapy visits where patient demonstrated improvement with strength, balance, ROM, pain control, fall prevention strategies, and functional mobility including bed mobility, transfers, gait, and HEP. Patient/CG agree to DC. Patient has current/updated medication list present in home. Referring Md notified of d/c.

## 2023-03-31 VITALS — SYSTOLIC BLOOD PRESSURE: 112 MMHG | HEART RATE: 68 BPM | TEMPERATURE: 98 F | DIASTOLIC BLOOD PRESSURE: 69 MMHG

## 2023-04-27 ENCOUNTER — HOME HEALTH ADMISSION (OUTPATIENT)
Dept: HOME HEALTH SERVICES | Facility: HOME HEALTH | Age: 88
End: 2023-04-27
Payer: MEDICARE

## 2023-04-28 ENCOUNTER — HOME CARE VISIT (OUTPATIENT)
Dept: SCHEDULING | Facility: HOME HEALTH | Age: 88
End: 2023-04-28

## 2023-04-28 PROCEDURE — 0221000100 HH NO PAY CLAIM PROCEDURE

## 2023-04-28 PROCEDURE — G0299 HHS/HOSPICE OF RN EA 15 MIN: HCPCS

## 2023-05-02 VITALS
OXYGEN SATURATION: 95 % | HEART RATE: 81 BPM | SYSTOLIC BLOOD PRESSURE: 134 MMHG | RESPIRATION RATE: 18 BRPM | DIASTOLIC BLOOD PRESSURE: 76 MMHG

## 2023-05-02 ASSESSMENT — ENCOUNTER SYMPTOMS: PAIN LOCATION - PAIN QUALITY: ACHE

## 2023-05-03 ENCOUNTER — HOME CARE VISIT (OUTPATIENT)
Dept: HOME HEALTH SERVICES | Facility: HOME HEALTH | Age: 88
End: 2023-05-03

## 2023-05-03 PROCEDURE — G0151 HHCP-SERV OF PT,EA 15 MIN: HCPCS

## 2023-05-03 PROCEDURE — G0300 HHS/HOSPICE OF LPN EA 15 MIN: HCPCS

## 2023-05-03 NOTE — HOME HEALTH
Patient resides at [facility]. Pt sitting chair upon arrival. Patient very hard of hearing. Vital signs stable. Skin warm, dry, and intact. Patient is dependent with transfers. Patient has wound RLE stage 1. Wound care done cleansed with NS, applied xeroform and foam dressing. Patient teaching done on safety, the importance of asking for assistace for transfers, and ways to prevent constipation. Patient verbalized understanding of teaching. POC approval received from PCP. Caregiver involvement: [facility] staff    Medications reconciled and all medications are available. No issues identified. Home health supplies by type and quantity ordered/delivered this visit include: gloves, moisture barrier. Patient education provided this visit: SN educated patient and patient's caregiver on safety in the home, use of assistive device, and ways to prevent constipation. Patient and patient caregiver verbalizes understanding via the teach back method. Progress toward goals: progressing well    Home exercise program: Therapy ordered for patient. Plan for next visit: Educate on infection control, disease process, safety in the home.     The following discharge planning was discussed with the patient/ patient's caregiver: DC when goals met

## 2023-05-05 ENCOUNTER — HOME CARE VISIT (OUTPATIENT)
Dept: HOME HEALTH SERVICES | Facility: HOME HEALTH | Age: 88
End: 2023-05-05

## 2023-05-05 ENCOUNTER — HOME CARE VISIT (OUTPATIENT)
Dept: SCHEDULING | Facility: HOME HEALTH | Age: 88
End: 2023-05-05

## 2023-05-05 VITALS
HEART RATE: 76 BPM | SYSTOLIC BLOOD PRESSURE: 114 MMHG | RESPIRATION RATE: 16 BRPM | DIASTOLIC BLOOD PRESSURE: 64 MMHG | OXYGEN SATURATION: 95 % | TEMPERATURE: 97.9 F

## 2023-05-05 VITALS — SYSTOLIC BLOOD PRESSURE: 122 MMHG | DIASTOLIC BLOOD PRESSURE: 66 MMHG | TEMPERATURE: 97.4 F | RESPIRATION RATE: 20 BRPM

## 2023-05-05 PROCEDURE — G0157 HHC PT ASSISTANT EA 15: HCPCS

## 2023-05-05 PROCEDURE — G0153 HHCP-SVS OF S/L PATH,EA 15MN: HCPCS

## 2023-05-05 PROCEDURE — G0299 HHS/HOSPICE OF RN EA 15 MIN: HCPCS

## 2023-05-05 ASSESSMENT — ENCOUNTER SYMPTOMS: DYSPNEA ACTIVITY LEVEL: AFTER AMBULATING MORE THAN 20 FT

## 2023-05-05 NOTE — HOME HEALTH
Problem: Patient had GLF with traumatic wound to RUE/RLE with routine healing, smaller, RLE 1x0.5x0.1, RUE 1.5x1x0.1, pink, scant SS drianage. Intervention: Wound care per POC, tolerated well, instructed on wound care, infection prevention, diet to promote healing, taught back understanding. Instructed on [facility] SOC when to natalie/911, safety/fall/pressure injury/bleeding/infection precautions, medications/disease management, pain, wound care and plan of care, taught back understanding. Goal: Patient will be safe at home free from falls/injury/infection, wound healed, pain controlled, rehab to optimal level of function.

## 2023-05-08 ENCOUNTER — HOME CARE VISIT (OUTPATIENT)
Dept: HOME HEALTH SERVICES | Facility: HOME HEALTH | Age: 88
End: 2023-05-08

## 2023-05-08 VITALS — SYSTOLIC BLOOD PRESSURE: 103 MMHG | TEMPERATURE: 97.4 F | DIASTOLIC BLOOD PRESSURE: 41 MMHG | HEART RATE: 69 BPM

## 2023-05-08 PROCEDURE — G0300 HHS/HOSPICE OF LPN EA 15 MIN: HCPCS

## 2023-05-09 VITALS — TEMPERATURE: 97.6 F | RESPIRATION RATE: 20 BRPM | DIASTOLIC BLOOD PRESSURE: 63 MMHG | SYSTOLIC BLOOD PRESSURE: 121 MMHG

## 2023-05-10 ENCOUNTER — HOME CARE VISIT (OUTPATIENT)
Dept: HOME HEALTH SERVICES | Facility: HOME HEALTH | Age: 88
End: 2023-05-10

## 2023-05-10 PROCEDURE — G0153 HHCP-SVS OF S/L PATH,EA 15MN: HCPCS

## 2023-05-10 PROCEDURE — G0157 HHC PT ASSISTANT EA 15: HCPCS

## 2023-05-12 ENCOUNTER — HOME CARE VISIT (OUTPATIENT)
Dept: HOME HEALTH SERVICES | Facility: HOME HEALTH | Age: 88
End: 2023-05-12

## 2023-05-12 ENCOUNTER — HOME CARE VISIT (OUTPATIENT)
Dept: HOME HEALTH SERVICES | Facility: HOME HEALTH | Age: 88
End: 2023-05-12
Payer: MEDICARE

## 2023-05-12 VITALS — TEMPERATURE: 97.4 F | SYSTOLIC BLOOD PRESSURE: 120 MMHG | RESPIRATION RATE: 19 BRPM | DIASTOLIC BLOOD PRESSURE: 65 MMHG

## 2023-05-12 VITALS — TEMPERATURE: 97.6 F | HEART RATE: 72 BPM | SYSTOLIC BLOOD PRESSURE: 104 MMHG | DIASTOLIC BLOOD PRESSURE: 48 MMHG

## 2023-05-12 PROCEDURE — G0157 HHC PT ASSISTANT EA 15: HCPCS

## 2023-05-12 PROCEDURE — G0300 HHS/HOSPICE OF LPN EA 15 MIN: HCPCS

## 2023-05-16 ENCOUNTER — HOME CARE VISIT (OUTPATIENT)
Dept: SCHEDULING | Facility: HOME HEALTH | Age: 88
End: 2023-05-16
Payer: MEDICARE

## 2023-05-16 PROCEDURE — G0300 HHS/HOSPICE OF LPN EA 15 MIN: HCPCS

## 2023-05-17 ENCOUNTER — HOME CARE VISIT (OUTPATIENT)
Dept: HOME HEALTH SERVICES | Facility: HOME HEALTH | Age: 88
End: 2023-05-17
Payer: MEDICARE

## 2023-05-17 ENCOUNTER — HOME CARE VISIT (OUTPATIENT)
Dept: HOME HEALTH SERVICES | Facility: HOME HEALTH | Age: 88
End: 2023-05-17

## 2023-05-17 PROCEDURE — G0153 HHCP-SVS OF S/L PATH,EA 15MN: HCPCS

## 2023-05-18 ENCOUNTER — HOME CARE VISIT (OUTPATIENT)
Dept: HOME HEALTH SERVICES | Facility: HOME HEALTH | Age: 88
End: 2023-05-18
Payer: MEDICARE

## 2023-05-19 ENCOUNTER — HOME CARE VISIT (OUTPATIENT)
Dept: HOME HEALTH SERVICES | Facility: HOME HEALTH | Age: 88
End: 2023-05-19
Payer: MEDICARE

## 2023-05-19 ENCOUNTER — HOME CARE VISIT (OUTPATIENT)
Dept: SCHEDULING | Facility: HOME HEALTH | Age: 88
End: 2023-05-19

## 2023-05-19 VITALS
TEMPERATURE: 97.8 F | SYSTOLIC BLOOD PRESSURE: 118 MMHG | OXYGEN SATURATION: 98 % | HEART RATE: 68 BPM | RESPIRATION RATE: 16 BRPM | DIASTOLIC BLOOD PRESSURE: 64 MMHG

## 2023-05-19 VITALS
RESPIRATION RATE: 18 BRPM | SYSTOLIC BLOOD PRESSURE: 105 MMHG | HEART RATE: 67 BPM | DIASTOLIC BLOOD PRESSURE: 60 MMHG | OXYGEN SATURATION: 98 % | TEMPERATURE: 97.3 F

## 2023-05-19 PROCEDURE — G0299 HHS/HOSPICE OF RN EA 15 MIN: HCPCS

## 2023-05-19 PROCEDURE — G0151 HHCP-SERV OF PT,EA 15 MIN: HCPCS

## 2023-05-19 ASSESSMENT — ENCOUNTER SYMPTOMS
BOWEL INCONTINENCE: 1
TROUBLE SWALLOWING: 1

## 2023-05-19 NOTE — HOME HEALTH
Seen by [facility] SN/PT/OT/ST for Dysphagia, weakness, unsteady gait, falls, wound care. Wounds resolved CDI/APOLINAR, no further interventions required. Had Barium swallow, no won Puree/HTL, tolerates well, continue aspiration precautions, instructed on dysphagia/aspiration, needs reinforcement, limited learning capacity, uses [employer] for communication. VS/SATS WNL, denies pain or discomfort. Continue routine care as directed by physician in [facility] setting, safety/fall/pressure injury/bleeding/aspiration/infection precautions. Take medications as ordered, follow up with MD as directed. Goal: Patient will be safe at home free from falls/injury/infection, wounds healed, free from complications of bleeding/aspiration, rehabilitate to optimal level of function. Patient discharged from [facility] to [facility] with assistance.

## 2023-05-20 VITALS
SYSTOLIC BLOOD PRESSURE: 107 MMHG | OXYGEN SATURATION: 97 % | DIASTOLIC BLOOD PRESSURE: 60 MMHG | TEMPERATURE: 97.1 F | RESPIRATION RATE: 18 BRPM | HEART RATE: 84 BPM

## 2023-05-20 NOTE — HOME HEALTH
Patient with traumatic wound to RUE/RLE    Wound Care Provided per care plan. Pt tolerated well. Caregiver involvement:  patient lives in [facility] with 24/7 assistance available    Medications reconciled and all medications are available in home. Home health supplies by type and quantity ordered/delivered this visit include: n/a    Patient education provided this visit: SN educated patient and patient's caregiver on s/s of infection, fall safety, proper hydration and nutrition for wound healing. Patient and patient caregiver verbalizes understanding via the teach back method. Progress toward goals: progressing well    Home exercise program:     Plan for next visit: wound assessment    The following discharge planning was discussed with the patient/ patient's caregiver: DC when goals met patient will be free from falls/injury/infection.

## 2023-05-20 NOTE — HOME HEALTH
Patient with traumatic wound to RUE/RLE  Wound Care Provided per care plan. Pt tolerated well. Caregiver involvement:  patient lives in [facility] with 24/7 assistance available  Medications reconciled and all medications are available in home. Home health supplies by type and quantity ordered/delivered this visit include: n/a  Patient education provided this visit: SN educated patient and patient's caregiver on s/s of infection, fall safety, proper hydration and nutrition for wound healing.  Patient and patient caregiver verbalizes understanding via the teach back method. Progress toward goals: progressing well  Home exercise program:   Plan for next visit: wound assessment  The following discharge planning was discussed with the patient/ patient's caregiver: DC when goals met patient will be free from falls/injury/infection.

## 2023-05-22 VITALS
DIASTOLIC BLOOD PRESSURE: 65 MMHG | HEART RATE: 64 BPM | OXYGEN SATURATION: 97 % | TEMPERATURE: 97.8 F | SYSTOLIC BLOOD PRESSURE: 116 MMHG | RESPIRATION RATE: 18 BRPM

## 2023-05-22 VITALS
HEART RATE: 64 BPM | SYSTOLIC BLOOD PRESSURE: 105 MMHG | RESPIRATION RATE: 18 BRPM | OXYGEN SATURATION: 97 % | DIASTOLIC BLOOD PRESSURE: 60 MMHG | TEMPERATURE: 98.2 F

## 2023-05-22 VITALS — DIASTOLIC BLOOD PRESSURE: 58 MMHG | RESPIRATION RATE: 97 BRPM | SYSTOLIC BLOOD PRESSURE: 118 MMHG | HEART RATE: 73 BPM

## 2023-05-22 NOTE — HOME HEALTH
Patient with traumatic wound to RLE    Wound Care Provided per care plan. Pt tolerated well. Caregiver involvement:  patient lives in [facility] with 24/7 assistance available    Medications reconciled and all medications are available in home. Home health supplies by type and quantity ordered/delivered this visit include: n/a    Patient education provided this visit: SN educated patient and patient's caregiver on s/s of infection, fall safety, proper hydration and nutrition for wound healing. Patient and patient caregiver verbalizes understanding via the teach back method. Progress toward goals: progressing well    Home exercise program:     Plan for next visit: wound assessment    The following discharge planning was discussed with the patient/ patient's caregiver: DC when goals met patient will be free from falls/injury/infection.

## 2023-05-22 NOTE — HOME HEALTH
Patient with traumatic wound to RLE    Wound Care Provided per care plan. Pt tolerated well. Pt denies pain or discomfort at this time. Caregiver involvement:  patient lives in [place] with 24/7 assistance available    Medications reconciled and all medications are available in home. Home health supplies by type and quantity ordered/delivered this visit include: n/a    Patient education provided this visit: SN educated patient and patient's caregiver on s/s of infection, fall safety, proper hydration and nutrition for wound healing. Patient and patient caregiver verbalizes understanding via the teach back method. Progress toward goals: progressing well    Home exercise program:     Plan for next visit: wound assessment    The following discharge planning was discussed with the patient/ patient's caregiver: DC when goals met patient will be free from falls/injury/infection.

## 2023-05-24 VITALS — RESPIRATION RATE: 20 BRPM | TEMPERATURE: 97.6 F | DIASTOLIC BLOOD PRESSURE: 64 MMHG | SYSTOLIC BLOOD PRESSURE: 122 MMHG

## 2024-02-09 NOTE — HOME HEALTH
Problem/Intervention/Goals: Patient seen for recent hosptialization from fall/PNA, open wound to LUE, now healed, CDI/APOLINAR. VS/SATS WNL, no cough, instructed on cough deep breathing via [address] [place] [address] 51 board, demonstrated understanding, SATS/VS WNL, no wheezing or SOB. Has met goals with [facility] therapy at maximum potential. [facility]/observation for wound care, monitor fragile skin/frequent wounds, risk for pressure injury. Instructed on PNA/wound care/pressure injury prevention, safety/fall precautions, medications/disease management and plan of care, needs reinforcement. Goal: Patient will be safe at home free from falls/injury/infection, wound healed, free from complications of PNA/free from S/S of infection, rehabilitate to optimal level of function. Additional Progress Note...

## 2024-09-15 NOTE — HOME HEALTH
Patient with wounds, mobility and cognitive impairment   Wound Care Provided per care plan. Pt tolerated well. Caregiver involvement: resides in [place]   Patient education provided this visit: SN educated patient and patient's caregiver on signs and symptoms of infection. Patient and patient caregiver verbalizes understanding via the teach back method.    Progress toward goals: progressing well client alert and oriented times 3, vital signs were stable, client denies pain at this time, wound care was provided per md order, measurements were obtained, no signs or symptoms of infection noted, no edema noted, lung sounds were clear to auscultation, no cough or shortness of breath was expressed at this time, fall precautions were maintained   Plan for next visit: wound care   discharge planning was discussed with the patient/ patient's caregiver: DC when goals met 1.58